# Patient Record
Sex: FEMALE | Race: WHITE | URBAN - NONMETROPOLITAN AREA
[De-identification: names, ages, dates, MRNs, and addresses within clinical notes are randomized per-mention and may not be internally consistent; named-entity substitution may affect disease eponyms.]

---

## 2021-06-17 ENCOUNTER — OFFICE VISIT (OUTPATIENT)
Dept: ENT CLINIC | Age: 61
End: 2021-06-17
Payer: COMMERCIAL

## 2021-06-17 ENCOUNTER — PROCEDURE VISIT (OUTPATIENT)
Dept: ENT CLINIC | Age: 61
End: 2021-06-17
Payer: COMMERCIAL

## 2021-06-17 VITALS
SYSTOLIC BLOOD PRESSURE: 126 MMHG | DIASTOLIC BLOOD PRESSURE: 70 MMHG | HEIGHT: 65 IN | WEIGHT: 225 LBS | BODY MASS INDEX: 37.49 KG/M2

## 2021-06-17 DIAGNOSIS — H92.01 RIGHT EAR PAIN: Primary | ICD-10-CM

## 2021-06-17 DIAGNOSIS — H69.83 EUSTACHIAN TUBE DYSFUNCTION, BILATERAL: Primary | ICD-10-CM

## 2021-06-17 PROCEDURE — 99202 OFFICE O/P NEW SF 15 MIN: CPT | Performed by: PHYSICIAN ASSISTANT

## 2021-06-17 PROCEDURE — 92567 TYMPANOMETRY: CPT | Performed by: AUDIOLOGIST

## 2021-06-17 PROCEDURE — 92552 PURE TONE AUDIOMETRY AIR: CPT | Performed by: AUDIOLOGIST

## 2021-06-17 RX ORDER — CEFUROXIME AXETIL 500 MG/1
TABLET ORAL
COMMUNITY
Start: 2021-06-07 | End: 2021-06-30 | Stop reason: ALTCHOICE

## 2021-06-17 RX ORDER — PSEUDOEPHEDRINE HYDROCHLORIDE 30 MG/1
30 TABLET ORAL 2 TIMES DAILY
Qty: 30 TABLET | Refills: 3 | Status: SHIPPED | OUTPATIENT
Start: 2021-06-17 | End: 2021-06-30

## 2021-06-17 RX ORDER — LEVOCETIRIZINE DIHYDROCHLORIDE 5 MG/1
TABLET, FILM COATED ORAL
COMMUNITY
Start: 2021-05-24 | End: 2021-09-10

## 2021-06-17 RX ORDER — FLUTICASONE PROPIONATE 50 MCG
2 SPRAY, SUSPENSION (ML) NASAL 2 TIMES DAILY
Qty: 1 BOTTLE | Refills: 3 | Status: SHIPPED | OUTPATIENT
Start: 2021-06-17 | End: 2021-06-30

## 2021-06-17 RX ORDER — CETIRIZINE HYDROCHLORIDE 10 MG/1
10 TABLET ORAL DAILY
COMMUNITY
End: 2021-09-10

## 2021-06-17 NOTE — PROGRESS NOTES
History   Donna Min is a 64 y.o. female who presented to the clinic this date with complaints of right ear pain and aural fullness. She reported onset approximately one month ago. She denied changes in hearing. She has had long standing bilateral tinnitus but denied changes with onset of ear pain. Summary   Results obtained today are consistent with normal TM mobility and normal hearing bilaterally. Results   Otoscopy:    Right: Clear EAC/Normal TM   Left: Clear EAC/Normal TM    Audiometry:    Right: Hearing WNL     Left: Hearing WNL           Tympanometry:     Right: Type A   Left: Type A    Plan   Results of today's testing were discussed with Ms. Tanna Severe and the following recommendations were made:    1. Follow up with ENT as scheduled.         Audiogram and Acoustic Immittance

## 2021-06-17 NOTE — PROGRESS NOTES
[facility] OTOLARYNGOLOGY/ENT  Mrs. Vicenta Finley is a pleasant 61-year-old  female that was referred by Dr. Campbell Al due to problems with persistent ear pain and muffled hearing. She reports that over the last 3 to 4 months she has had increased amounts of problem with muffled hearing. Also admits to nonpulsatile tinnitus that has been noticeable more so in the left than right. She denies any vertigo. She has been treated with multiple antibiotics and steroids with no long-term success. Audiology studies were completed and reviewed with the patient. She was noted with normal hearing bilaterally with a type a tympanogram bilaterally. Allergies: Patient has no known allergies. Current Outpatient Medications   Medication Sig Dispense Refill    cetirizine (ZYRTEC) 10 MG tablet Take 10 mg by mouth daily      pseudoephedrine (DECONGESTANT) 30 MG tablet Take 1 tablet by mouth 2 times daily 30 tablet 3    fluticasone (FLONASE) 50 MCG/ACT nasal spray 2 sprays by Each Nostril route 2 times daily 1 Bottle 3    levocetirizine (XYZAL) 5 MG tablet       cefUROXime (CEFTIN) 500 MG tablet        No current facility-administered medications for this visit. Past Surgical History:   Procedure Laterality Date    APPENDECTOMY      HERNIA REPAIR      TONSILLECTOMY      TUBAL LIGATION         History reviewed. No pertinent past medical history. Family History   Problem Relation Age of Onset    Cancer Mother     Cancer Father        Social History     Tobacco Use    Smoking status: Never Smoker    Smokeless tobacco: Never Used   Substance Use Topics    Alcohol use: Never           REVIEW OF SYSTEMS:  all other systems reviewed and are negative  Review of Systems   Constitutional: Negative for chills and fever. HENT: Positive for tinnitus.  Negative for congestion, dental problem, ear discharge, ear pain, facial swelling, hearing loss, nosebleeds, postnasal drip, rhinorrhea, sinus pressure, sinus pain, sneezing, sore throat, trouble swallowing and voice change. Eyes: Negative for pain and discharge. Neurological: Negative for dizziness, seizures, syncope and headaches. Comments:     PHYSICAL EXAM:    /70   Ht 5' 5\" (1.651 m)   Wt 225 lb (102.1 kg)   BMI 37.44 kg/m²   Body mass index is 37.44 kg/m². General Appearance: well developed  and well nourished  Head/ Face: normocephalic and atraumatic  Vocal Quality: good/ normal  Ears: Right Ear: External: external ears normal Otoscopy Ear Canal: canal clear Otoscopy TM: TM's dull and TM's sluggish Left Ear: External: external ears normal Otoscopy Ear Canal: canal clear Otoscopy TM: TM's dull and TM's sluggish  Hearing: Rinne A>B: Left, Rinne A>B: Right and Bowens M  Nose: nares normal and septum midline  Neck: supple and adenopathy none palpable  Thyroid: normal and nodules No    Assessment & Plan:    Problem List Items Addressed This Visit     Eustachian tube dysfunction, bilateral - Primary     Eustachian tube dysfunction bilaterally with left greater than right  Plan: I will place the patient on Flonase nasal spray twice a day as well as Sudafed 3 times a day. Patient is advised to hold her antihistamines for 2 weeks. She is to follow-up me in 2 weeks for reevaluation. She is reminded to call if she has any questions or problems. No orders of the defined types were placed in this encounter. Orders Placed This Encounter   Medications    pseudoephedrine (DECONGESTANT) 30 MG tablet     Sig: Take 1 tablet by mouth 2 times daily     Dispense:  30 tablet     Refill:  3    fluticasone (FLONASE) 50 MCG/ACT nasal spray     Si sprays by Each Nostril route 2 times daily     Dispense:  1 Bottle     Refill:  3       Electronically signed by Ellis Correa PA-C on 21 at 8:44 PM CDT          Please note that this chart was generated using dragon dictation software.   Although every effort was made to ensure the accuracy of this automated transcription, some errors in transcription may have occurred.

## 2021-06-20 PROBLEM — H69.83 EUSTACHIAN TUBE DYSFUNCTION, BILATERAL: Status: ACTIVE | Noted: 2021-06-20

## 2021-06-20 ASSESSMENT — ENCOUNTER SYMPTOMS
SINUS PRESSURE: 0
RHINORRHEA: 0
VOICE CHANGE: 0
TROUBLE SWALLOWING: 0
SINUS PAIN: 0
EYE PAIN: 0
EYE DISCHARGE: 0
FACIAL SWELLING: 0
SORE THROAT: 0

## 2021-06-21 NOTE — ASSESSMENT & PLAN NOTE
Eustachian tube dysfunction bilaterally with left greater than right  Plan: I will place the patient on Flonase nasal spray twice a day as well as Sudafed 3 times a day. Patient is advised to hold her antihistamines for 2 weeks. She is to follow-up me in 2 weeks for reevaluation. She is reminded to call if she has any questions or problems.

## 2021-06-30 ENCOUNTER — OFFICE VISIT (OUTPATIENT)
Dept: ENT CLINIC | Age: 61
End: 2021-06-30
Payer: COMMERCIAL

## 2021-06-30 VITALS
SYSTOLIC BLOOD PRESSURE: 138 MMHG | DIASTOLIC BLOOD PRESSURE: 72 MMHG | WEIGHT: 225 LBS | BODY MASS INDEX: 37.49 KG/M2 | HEIGHT: 65 IN

## 2021-06-30 DIAGNOSIS — H69.83 EUSTACHIAN TUBE DYSFUNCTION, BILATERAL: Primary | ICD-10-CM

## 2021-06-30 PROCEDURE — 99213 OFFICE O/P EST LOW 20 MIN: CPT | Performed by: PHYSICIAN ASSISTANT

## 2021-06-30 RX ORDER — FLUTICASONE PROPIONATE 50 MCG
2 SPRAY, SUSPENSION (ML) NASAL 2 TIMES DAILY
Qty: 1 BOTTLE | Refills: 3 | Status: SHIPPED | OUTPATIENT
Start: 2021-06-30 | End: 2021-09-10

## 2021-06-30 RX ORDER — PSEUDOEPHEDRINE HYDROCHLORIDE 30 MG/1
30 TABLET ORAL 2 TIMES DAILY
Qty: 30 TABLET | Refills: 2 | Status: SHIPPED | OUTPATIENT
Start: 2021-06-30 | End: 2021-09-10

## 2021-07-12 ENCOUNTER — OFFICE VISIT (OUTPATIENT)
Dept: ENT CLINIC | Age: 61
End: 2021-07-12
Payer: COMMERCIAL

## 2021-07-12 VITALS — WEIGHT: 225 LBS | BODY MASS INDEX: 37.49 KG/M2 | HEIGHT: 65 IN

## 2021-07-12 DIAGNOSIS — H92.01 ACUTE OTALGIA, RIGHT: ICD-10-CM

## 2021-07-12 PROCEDURE — 99212 OFFICE O/P EST SF 10 MIN: CPT | Performed by: OTOLARYNGOLOGY

## 2021-07-12 RX ORDER — CEFDINIR 300 MG/1
CAPSULE ORAL
COMMUNITY
Start: 2021-07-09 | End: 2021-09-08 | Stop reason: ALTCHOICE

## 2021-07-12 RX ORDER — PREDNISONE 10 MG/1
TABLET ORAL
COMMUNITY
Start: 2021-07-09 | End: 2021-09-08 | Stop reason: ALTCHOICE

## 2021-07-12 NOTE — PROGRESS NOTES
63 y.o.  female presents today with right ear discomfort. She states that on Friday her right ear was very painful. She also complained of pain in the roof of her mouth on the right side. She was seen by her PCP on Friday and given prescriptions for Omnicef and prednisone. Over the weekend she noticeably improved and does not have much discomfort on today's visit but wished that I would check her ear. She is a patient of Crystal Jackson who is not available today. Family History   Problem Relation Age of Onset    Cancer Mother     Cancer Father      Social History     Socioeconomic History    Marital status: Unknown     Spouse name: None    Number of children: None    Years of education: None    Highest education level: None   Occupational History    None   Tobacco Use    Smoking status: Never Smoker    Smokeless tobacco: Never Used   Substance and Sexual Activity    Alcohol use: Never    Drug use: Never    Sexual activity: Yes     Partners: Male   Other Topics Concern    None   Social History Narrative    None     Social Determinants of Health     Financial Resource Strain:     Difficulty of Paying Living Expenses:    Food Insecurity:     Worried About Running Out of Food in the Last Year:     Ran Out of Food in the Last Year:    Transportation Needs:     Lack of Transportation (Medical):      Lack of Transportation (Non-Medical):    Physical Activity:     Days of Exercise per Week:     Minutes of Exercise per Session:    Stress:     Feeling of Stress :    Social Connections:     Frequency of Communication with Friends and Family:     Frequency of Social Gatherings with Friends and Family:     Attends Mu-ism Services:     Active Member of Clubs or Organizations:     Attends Club or Organization Meetings:     Marital Status:    Intimate Partner Violence:     Fear of Current or Ex-Partner:     Emotionally Abused:     Physically Abused:     Sexually Abused:      History

## 2021-07-12 NOTE — ASSESSMENT & PLAN NOTE
Apparently she called Friday with right ear discomfort. She is a patient of Alglisa Niece who was not available on that day. She went to her PCP and was given prescriptions for prednisone and Omnicef. Her evaluation with me today I thought the right ear looks fine with no evidence of inflammation or middle ear effusion. As she was improving I simply recommended she complete her medication as directed.   If she wants to be seen for follow-up I believe she does have an appointment upcoming with Mara Lima

## 2021-07-21 ENCOUNTER — OFFICE VISIT (OUTPATIENT)
Dept: ENT CLINIC | Age: 61
End: 2021-07-21
Payer: COMMERCIAL

## 2021-07-21 VITALS
DIASTOLIC BLOOD PRESSURE: 72 MMHG | WEIGHT: 225 LBS | BODY MASS INDEX: 37.49 KG/M2 | SYSTOLIC BLOOD PRESSURE: 130 MMHG | HEIGHT: 65 IN

## 2021-07-21 DIAGNOSIS — H92.01 ACUTE OTALGIA, RIGHT: Primary | ICD-10-CM

## 2021-07-21 PROCEDURE — 99213 OFFICE O/P EST LOW 20 MIN: CPT | Performed by: PHYSICIAN ASSISTANT

## 2021-07-21 NOTE — PROGRESS NOTES
Flora Kwok is a pleasant 65-year-old  female that presents for 2-week follow-up due to eustachian tube dysfunction bilaterally. She reports that she still suffers with muffled hearing and chronic right ear pain. She reports that last week she developed severe pain to the right side of the roof of her mouth as well as pain to the right molars. She was seen by Dr. Jenifer Marie and treated for sinusitis. Currently she is scheduled for a CT of the sinuses which is currently pending. She denies any history with fever, chills, or rhinorrhea. Physical examination with the microscope revealed the patient to have a dull TM bilaterally with mobility noted to be fairly normal.  There was no abnormalities to the ear canal bilaterally. Oral exam demonstrated no erythema to the posterior pharyngeal wall. She is noted with no sinus tenderness to percussion over the maxillary and frontal region. Impression: Persistent right-sided otalgia with new development of right sided molar pain suspicious for sinus disease    Plan: I will await the results of the CT from [place]. I will call her the results once I look over the CT. If her CT is noted to be negative for sinus disease, would recommend a referral to Dr. Glenn Meredith for a myringotomy.       Electronically signed by Eduardo Cornejo PA-C on 7/21/21 at 1:27 PM CDT

## 2021-09-08 ENCOUNTER — OFFICE VISIT (OUTPATIENT)
Dept: ENT CLINIC | Age: 61
End: 2021-09-08
Payer: COMMERCIAL

## 2021-09-08 VITALS — HEIGHT: 65 IN | BODY MASS INDEX: 37.49 KG/M2 | WEIGHT: 225 LBS

## 2021-09-08 DIAGNOSIS — H69.81 EUSTACHIAN TUBE DYSFUNCTION, RIGHT: ICD-10-CM

## 2021-09-08 DIAGNOSIS — H60.311 ACUTE DIFFUSE OTITIS EXTERNA OF RIGHT EAR: Primary | ICD-10-CM

## 2021-09-08 PROCEDURE — 99213 OFFICE O/P EST LOW 20 MIN: CPT | Performed by: PHYSICIAN ASSISTANT

## 2021-09-08 RX ORDER — CIPROFLOXACIN AND DEXAMETHASONE 3; 1 MG/ML; MG/ML
4 SUSPENSION/ DROPS AURICULAR (OTIC) 2 TIMES DAILY
Qty: 7.5 ML | Refills: 0 | Status: SHIPPED | OUTPATIENT
Start: 2021-09-08 | End: 2021-09-18

## 2021-09-08 ASSESSMENT — ENCOUNTER SYMPTOMS
RHINORRHEA: 0
EYE PAIN: 0
FACIAL SWELLING: 0
SINUS PRESSURE: 1
TROUBLE SWALLOWING: 0
SORE THROAT: 0
SINUS PAIN: 0
VOICE CHANGE: 0
EYE DISCHARGE: 0

## 2021-09-08 NOTE — ASSESSMENT & PLAN NOTE
Otitis externa of the right earconfirmed by physical exam  Plan: I will place the patient on Ciprodex eardrops for 10 days. She is to follow-up in 2 weeks for reevaluation.

## 2021-09-08 NOTE — PROGRESS NOTES
Joao Jean Baptiste is a pleasant 65-year-old  female that presents for follow-up due to persistent right otalgia. She reports that this seems to be worsening and she complains of muffled hearing. She admits to drainage from the ear that has been clear. She denies any fever, chills, or or any additional problems. Allergies: Patient has no known allergies. Current Outpatient Medications   Medication Sig Dispense Refill    ciprofloxacin-dexamethasone (CIPRODEX) 0.3-0.1 % otic suspension Place 4 drops into the right ear 2 times daily for 10 days 7.5 mL 0    pseudoephedrine (DECONGESTANT) 30 MG tablet Take 1 tablet by mouth 2 times daily (Patient not taking: Reported on 9/8/2021) 30 tablet 2    fluticasone (FLONASE) 50 MCG/ACT nasal spray 2 sprays by Each Nostril route 2 times daily (Patient not taking: Reported on 9/8/2021) 1 Bottle 3    levocetirizine (XYZAL) 5 MG tablet  (Patient not taking: Reported on 9/8/2021)      cetirizine (ZYRTEC) 10 MG tablet Take 10 mg by mouth daily  (Patient not taking: Reported on 9/8/2021)       No current facility-administered medications for this visit. Past Surgical History:   Procedure Laterality Date    APPENDECTOMY      HERNIA REPAIR      TONSILLECTOMY      TUBAL LIGATION         History reviewed. No pertinent past medical history. Family History   Problem Relation Age of Onset    Cancer Mother     Cancer Father        Social History     Tobacco Use    Smoking status: Never Smoker    Smokeless tobacco: Never Used   Substance Use Topics    Alcohol use: Never           REVIEW OF SYSTEMS:  all other systems reviewed and are negative  Review of Systems   Constitutional: Negative for chills and fever. HENT: Positive for congestion and sinus pressure.  Negative for dental problem, ear discharge, ear pain, facial swelling, hearing loss, nosebleeds, postnasal drip, rhinorrhea, sinus pain, sneezing, sore throat, tinnitus, trouble swallowing and voice change. Eyes: Negative for pain and discharge. Neurological: Negative for dizziness and headaches. Comments:     PHYSICAL EXAM:    Ht 5' 5\" (1.651 m)   Wt 225 lb (102.1 kg)   BMI 37.44 kg/m²   Body mass index is 37.44 kg/m². General Appearance: well developed  and well nourished  Head/ Face: normocephalic and atraumatic  Vocal Quality: good/ normal  Ears: Right Ear: External: external ears normal Otoscopy Ear Canal: purulent discharge Otoscopy TM: TM's mobile and TM's dull Left Ear: External: external ears normal Otoscopy Ear Canal: canal clear Otoscopy TM: tympanosclerosis: moderate  Hearing: grossly intact  Nose: nares normal and septum midline  Neck: supple and adenopathy none palpable  Thyroid: normal and nodules No   No sinus tenderness was appreciated to percussion of the maxillary and frontal sinuses. Assessment & Plan:    Problem List Items Addressed This Visit     Eustachian tube dysfunction, right     Eustachian tube dysfunction of the right ear with failed medical management  Plan: I advised the patient to follow-up in 2 weeks to see [place] for audiology screening prior to seeing Dr. Anabel Reich for consideration of a myringotomy. She was advised that if she has any questions or problems she is to call us for further advice. Acute diffuse otitis externa of right ear - Primary      Otitis externa of the right earconfirmed by physical exam  Plan: I will place the patient on Ciprodex eardrops for 10 days. She is to follow-up in 2 weeks for reevaluation. Relevant Medications    ciprofloxacin-dexamethasone (CIPRODEX) 0.3-0.1 % otic suspension          No orders of the defined types were placed in this encounter.       Orders Placed This Encounter   Medications    ciprofloxacin-dexamethasone (CIPRODEX) 0.3-0.1 % otic suspension     Sig: Place 4 drops into the right ear 2 times daily for 10 days     Dispense:  7.5 mL     Refill:  0       Electronically signed by Deangelo Chung PA-C on 9/8/21 at 3:15 PM CDT          Please note that this chart was generated using dragon dictation software. Although every effort was made to ensure the accuracy of this automated transcription, some errors in transcription may have occurred.

## 2021-09-08 NOTE — ASSESSMENT & PLAN NOTE
Eustachian tube dysfunction of the right ear with failed medical management  Plan: I advised the patient to follow-up in 2 weeks to see [place] for audiology screening prior to seeing Dr. Maddi Sotomayor for consideration of a myringotomy. She was advised that if she has any questions or problems she is to call us for further advice.

## 2021-09-10 ENCOUNTER — OFFICE VISIT (OUTPATIENT)
Dept: ENT CLINIC | Age: 61
End: 2021-09-10
Payer: COMMERCIAL

## 2021-09-10 VITALS — BODY MASS INDEX: 37.49 KG/M2 | HEIGHT: 65 IN | WEIGHT: 225 LBS

## 2021-09-10 DIAGNOSIS — H69.81 EUSTACHIAN TUBE DYSFUNCTION, RIGHT: Primary | ICD-10-CM

## 2021-09-10 DIAGNOSIS — H60.311 ACUTE DIFFUSE OTITIS EXTERNA OF RIGHT EAR: ICD-10-CM

## 2021-09-10 PROCEDURE — 99212 OFFICE O/P EST SF 10 MIN: CPT | Performed by: PHYSICIAN ASSISTANT

## 2021-09-10 NOTE — PROGRESS NOTES
Ms. Yashira Landers is a pleasant 65-year-old  female that was seen 2 days ago due to acute right otalgia. She was noted with chronic eustachian tube dysfunction that has not responded medication as well as a secondary otitis externa of the right ear. The canal was somewhat swollen therefore I prescribed her Ciprodex. She reports that she would lie on her side for 3 hours with the fluid in the ear. Then she would stand and the fluid would come out. She is concerned that her ear canal is completely swollen shut and is requesting consideration for a wick. She continues to complain of a dull pain to the right ear. Physical examination with the microscope of the left ear demonstrated partial mild cerumen that was removed with the suction with no complications. The TM appeared to be dull with no air-fluid levels with decreased mobility. Examination of the right ear demonstrated surprisingly resolved otitis externa with a normal canal.  The TM continue to be dull with poor mobility consistent with the eustachian tube dysfunction. Impression: Resolved right otitis externa, persistent right middle ear effusion secondary to eustachian tube dysfunction    Plan: I recommended the patient to keep her appoint with Dr. Michael Menezes for consideration of a myringotomy with possible tube placement. She is agreeable for this recommendation. She is reminded to call if she has any questions or problems.       Electronically signed by Nathanael Wade PA-C on 9/10/21 at 9:57 AM YODITT

## 2021-09-30 ENCOUNTER — PROCEDURE VISIT (OUTPATIENT)
Dept: ENT CLINIC | Age: 61
End: 2021-09-30
Payer: COMMERCIAL

## 2021-09-30 VITALS — HEIGHT: 65 IN | BODY MASS INDEX: 37.49 KG/M2 | WEIGHT: 225 LBS

## 2021-09-30 DIAGNOSIS — H69.81 EUSTACHIAN TUBE DYSFUNCTION, RIGHT: ICD-10-CM

## 2021-09-30 DIAGNOSIS — H92.01 ACUTE OTALGIA, RIGHT: ICD-10-CM

## 2021-09-30 DIAGNOSIS — H92.01 ACUTE OTALGIA, RIGHT: Primary | ICD-10-CM

## 2021-09-30 PROCEDURE — 92553 AUDIOMETRY AIR & BONE: CPT | Performed by: AUDIOLOGIST

## 2021-09-30 PROCEDURE — 31575 DIAGNOSTIC LARYNGOSCOPY: CPT | Performed by: OTOLARYNGOLOGY

## 2021-09-30 PROCEDURE — 92567 TYMPANOMETRY: CPT | Performed by: AUDIOLOGIST

## 2021-09-30 PROCEDURE — 99213 OFFICE O/P EST LOW 20 MIN: CPT | Performed by: OTOLARYNGOLOGY

## 2021-09-30 RX ORDER — GABAPENTIN 300 MG/1
CAPSULE ORAL
Qty: 60 CAPSULE | Refills: 5 | Status: SHIPPED | OUTPATIENT
Start: 2021-09-30 | End: 2021-12-30

## 2021-09-30 NOTE — PROGRESS NOTES
63 y.o.  female presents today with right ear pain. She states this is been plaguing her since April. She currently needs to take ibuprofen three times daily in order to control the pain and discomfort. She was recently treated with drops for external otitis however her ear pain has persisted. No hearing loss is reported. She did have a dental exam in May which she states was unremarkable although no dental films were performed. She has no history of TMJ. She is status post tonsillectomy. Family History   Problem Relation Age of Onset    Cancer Mother     Cancer Father      Social History     Socioeconomic History    Marital status: Unknown     Spouse name: None    Number of children: None    Years of education: None    Highest education level: None   Occupational History    None   Tobacco Use    Smoking status: Never Smoker    Smokeless tobacco: Never Used   Substance and Sexual Activity    Alcohol use: Never    Drug use: Never    Sexual activity: Yes     Partners: Male   Other Topics Concern    None   Social History Narrative    None     Social Determinants of Health     Financial Resource Strain:     Difficulty of Paying Living Expenses:    Food Insecurity:     Worried About Running Out of Food in the Last Year:     Ran Out of Food in the Last Year:    Transportation Needs:     Lack of Transportation (Medical):      Lack of Transportation (Non-Medical):    Physical Activity:     Days of Exercise per Week:     Minutes of Exercise per Session:    Stress:     Feeling of Stress :    Social Connections:     Frequency of Communication with Friends and Family:     Frequency of Social Gatherings with Friends and Family:     Attends Mosque Services:     Active Member of Clubs or Organizations:     Attends Club or Organization Meetings:     Marital Status:    Intimate Partner Violence:     Fear of Current or Ex-Partner:     Emotionally Abused:     Physically Abused:     Sexually Abused:      History reviewed. No pertinent past medical history. Past Surgical History:   Procedure Laterality Date    APPENDECTOMY      HERNIA REPAIR      TONSILLECTOMY      TUBAL LIGATION           REVIEW OF SYSTEMS:  all other systems reviewed and are negative  General Health: fevers: No and see HPI / problem list  Ears: ear pain Yes Right recent infection No  Right recent drainage No  Right ear itching No  Right ear popping/ fullness No  none  Hearing: denies hearing problems  Throat: denies related complaints, pain: No, vocal difficulties: No, difficulty swallowing: No and painful swallowing: No  Neck: lump(s)/ mass: No       Comments:     PHYSICAL EXAM:    Ht 5' 5\" (1.651 m)   Wt 225 lb (102.1 kg)   BMI 37.44 kg/m²   Body mass index is 37.44 kg/m². General Appearance: well developed , well nourished and no distress  Head/ Face: normocephalic and atraumatic  Vocal Quality: good/ normal  Ears: Right Ear: External: external ears normal and TMJ tender : No Otoscopy Ear Canal: canal clear Otoscopy TM: TM's normal and TM's mobile Left Ear: External: external ears normal and TMJ tender : No Otoscopy Ear Canal: canal clear Otoscopy TM: TM's normal and TM's mobile  Hearing: grossly intact and see audiogram  Nose: nares normal  Oral:lips: normal Oropharynx:normal tongue: normal   Dentition: good dentition and Multiple fillings but no obvious caries. Tonsils: s/p tonsillectomy  Neck: negative and adenopathy none palpable  Larynx: see endoscopy report  Hypopharynx: see endoscopy report  Neuro: alert and oriented x3 and cranial nerves II- XII grossly intact  Psych/ Mood: cooperative and no depression, anxiety or agitation    Assessment & Plan:    Problem List Items Addressed This Visit     Chronic otalgia, right     Ear canal clear. No TMJ pain. TM normal with no erythema. No evidence of middle ear effusion. Normal tympanogram.  Normal hearing testing. Status post tonsillectomy.   No neck pain tenderness or masses. No pain with swallowing. Negative endoscopic exam of hypopharynx and larynx. Possible neuropathy. Will place on gabapentin as long-term ibuprofen may lead to some undesirable complications. This will also act as a treatment trial for neuropathy. We will get C-spine films. Will refer to neurology for evaluation. Relevant Medications    gabapentin (NEURONTIN) 300 MG capsule    Other Relevant Orders    CBC Auto Differential    Sedimentation Rate    XR CERVICAL SPINE (2-3 VIEWS)    18493 - NM LARYNGOSCOPY,FLEX FIBER,DIAGNOSTIC (Completed)    Travis Young MD, Neurology, [place]          Orders Placed This Encounter   Procedures    XR CERVICAL SPINE (2-3 VIEWS)     Standing Status:   Future     Standing Expiration Date:   10/30/2021     Order Specific Question:   Reason for exam:     Answer:   Complains of right otalgia. Normal ear exam    CBC Auto Differential     Standing Status:   Future     Standing Expiration Date:   9/30/2022    Sedimentation Rate     Standing Status:   Future     Standing Expiration Date:   9/30/2022   Travis Young MD, Neurology, [place]     Referral Priority:   Routine     Referral Type:   Eval and Treat     Referral Reason:   Specialty Services Required     Referred to Provider:   Natasha Altamirano MD     Requested Specialty:   Neurology     Number of Visits Requested:   1    64255 - NM LARYNGOSCOPY,FLEX FIBER,DIAGNOSTIC     After application of topical anesthetic/decongestant, the flexible fiberoptic scope was used to evaluate the larynx and hypopharynx. The tongue base appeared normal.  There was no evidence of granulation or ulceration. The hypopharynx also appeared normal with no pooling of secretions ulceration inflammation granulation or any other normality. The larynx itself was normal in appearance with no evidence of inflammation. Both vocal cords were freely mobile.   Overall unremarkable examination of the upper aerodigestive tract and no obvious evidence of source of referred right ear pain       Orders Placed This Encounter   Medications    gabapentin (NEURONTIN) 300 MG capsule     Si mg daily on day 1. Then twice daily on day 2. Then 3 times daily on day 3 and for maintenance thereafter. Dispense:  60 capsule     Refill:  5             Please note that this chart was generated using dragon dictation software. Although every effort was made to ensure the accuracy of this automated transcription, some errors in transcription may have occurred.

## 2021-09-30 NOTE — ASSESSMENT & PLAN NOTE
Ear canal clear. No TMJ pain. TM normal with no erythema. No evidence of middle ear effusion. Normal tympanogram.  Normal hearing testing. Status post tonsillectomy. No neck pain tenderness or masses. No pain with swallowing. Negative endoscopic exam of hypopharynx and larynx. Possible neuropathy. Will place on gabapentin as long-term ibuprofen may lead to some undesirable complications. This will also act as a treatment trial for neuropathy. We will get C-spine films. Will refer to neurology for evaluation.

## 2021-09-30 NOTE — PROGRESS NOTES
History   Mandeep Sanders is a 63 y.o. female who presented to the clinic this date with continued complaints of right aural fullness and muffled hearing. Summary   Tympanometry consistent with normal TM mobility bilaterally. Pure tone testing indicates normal hearing bilaterally. Results   Otoscopy:    Right: Clear EAC/Normal TM   Left: Clear EAC/Normal TM    Audiometry:    Right: Hearing WNL     Left: Hearing WNL           Tympanometry:     Right: Type A   Left: Type A    Plan   Results of today's testing were discussed with Ms. Nestor Miller and the following recommendations were made:    1. Follow up with ENT as scheduled.         Audiogram and Acoustic Immittance

## 2021-10-01 ENCOUNTER — TELEPHONE (OUTPATIENT)
Dept: NEUROLOGY | Age: 61
End: 2021-10-01

## 2021-10-01 NOTE — TELEPHONE ENCOUNTER
Received a referral from Dr. Adolph Rey office for this patient. Called and left a VM to let patient know when appointment has been scheduled. Left a VM regarding the appointment time/date/location/phone #.

## 2024-01-01 NOTE — PROGRESS NOTES
Judie Gonzalez is a pleasant 53-year-old  female that presents for a 2-week follow-up due to eustachian tube dysfunction bilaterally. She reports that she has tolerating the medication well and is actually feeling better. She still feels like she is not back to normal baseline. Currently she reports that the right ear is worse than the left as far as discomfort. She denies any drainage from the external canals. Physical examination with the microscope revealed no evidence of air-fluid levels with fairly good mobility bilaterally. Neck exam demonstrated no lymphadenopathy or thyromegaly. Oral exam was unremarkable. Impression: Clinically improving eustachian tube dysfunction bilaterally      Plan: After reviewing her medications it was discovered she was still taking Zyrtec therefore I will D/C this for 30 days and continue the Sudafed and Flonase. She is follow-up me in 3 weeks.       Electronically signed by Tesah Hogue PA-C on 6/30/21 at 9:08 AM CDT 14